# Patient Record
(demographics unavailable — no encounter records)

---

## 2024-10-30 NOTE — HISTORY OF PRESENT ILLNESS
[de-identified] : DRY COUGH, RUNNY NOSE [FreeTextEntry6] : 16 yr old F presenting for three days of cough. Endorses dry cough, intermittent, rhinorrhea and congestion. Denies sore throat, chest pain, change in energy or appetite.

## 2024-10-30 NOTE — DISCUSSION/SUMMARY
[FreeTextEntry1] : 16 year old F with symptoms likely 2/2 viral URI. PE and vitals are wnl.   Recommend supportive care including antipyretics, fluids, and humidifier/warm bath, then nasal saline followed by nasal suction. Education provided for signs of respiratory distress and dehydration. Return if symptoms worsen or persist.

## 2024-10-30 NOTE — PHYSICAL EXAM
[Erythematous Oropharynx] : erythematous oropharynx [NL] : warm, clear [Tired appearing] : not tired appearing [Lethargic] : not lethargic [Enlarged Tonsils] : tonsils not enlarged [Vesicles] : no vesicles [Exudate] : no exudate [Wheezing] : no wheezing [Crackles] : no crackles [Transmitted Upper Airway Sounds] : no transmitted upper airway sounds [Tachypnea] : no tachypnea [Belly Breathing] : no belly breathing [Subcostal Retractions] : no subcostal retractions

## 2024-10-30 NOTE — HISTORY OF PRESENT ILLNESS
[de-identified] : DRY COUGH, RUNNY NOSE [FreeTextEntry6] : 16 yr old F presenting for three days of cough. Endorses dry cough, intermittent, rhinorrhea and congestion. Denies sore throat, chest pain, change in energy or appetite.

## 2025-03-14 NOTE — HISTORY OF PRESENT ILLNESS
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

## 2025-03-14 NOTE — PLAN
[TextEntry] : RECOMMENDED HGB CHECK, BUT DECLINES AT THIS TIME DUE TO FEAR; ADVISED ADEQUATE HYDRATION AND INCREASING IRON-RICH FOODS IN DIET; DISCUSSED WHEN SHOULD SEEK FURTHER CARE, INCLUDING BLEEDING >10DAYS

## 2025-03-14 NOTE — REVIEW OF SYSTEMS
[Dysuria] : no dysuria [Polyuria] : no polyuria [Hematuria] : no hematuria [Irregular Menstrual Cycle] : irregular menstrual cycle [Vaginal Pain] : no vaginal pain [Negative] : Heme/Lymph [FreeTextEntry1] : STARTED MENARCHE AT 9 AND REPORTS HAVING REGULAR PERIODS IN LAST FEW YEARS

## 2025-04-23 NOTE — HISTORY OF PRESENT ILLNESS
[de-identified] : HEADACHES AFTER SLEEPING [FreeTextEntry6] :  17 yo female with Headaches for several days. Start in front, move to around back of head. 6/10 intensity. no blurry vision, no photophobia. motrin helping. diagnosed with UTI last week - called today with results so only started medication this afternoon. no fevers. no abdominal pain. no vomiting. Mild uri symptoms.   Following with GYN for irregular menses last month. LMP 3/27-4/8.

## 2025-04-23 NOTE — DISCUSSION/SUMMARY
[FreeTextEntry1] : 15 yo female here with headaches. No red flag symptoms. Differential diagnosis includes URI related as she has had mild symptoms over the past few days vs secondary to active UTI vs tension headaches vs related to upcoming menstrual cycle (as discussed with GYN at last visit per patient/grandma).   Continue Motrin as needed Headache diary Close monitoring.

## 2025-04-23 NOTE — PHYSICAL EXAM
[Acute Distress] : no acute distress [Alert] : alert [Tenderness] : no tenderness [Ecchymosis] : no ecchymosis [Traumatic] : atraumatic [EOMI] : grossly EOMI [Conjuctival Injection] : no conjunctival injection [Eyelid Swelling] : no eyelid swelling [Clear] : right tympanic membrane clear [Inflamed Nasal Mucosa] : inflamed nasal mucosa [NL] : soft, nontender, nondistended, normal bowel sounds, no hepatosplenomegaly [de-identified] : MMM

## 2025-04-23 NOTE — HISTORY OF PRESENT ILLNESS
[de-identified] : HEADACHES AFTER SLEEPING [FreeTextEntry6] :  17 yo female with Headaches for several days. Start in front, move to around back of head. 6/10 intensity. no blurry vision, no photophobia. motrin helping. diagnosed with UTI last week - called today with results so only started medication this afternoon. no fevers. no abdominal pain. no vomiting. Mild uri symptoms.   Following with GYN for irregular menses last month. LMP 3/27-4/8.

## 2025-04-23 NOTE — PHYSICAL EXAM
[Acute Distress] : no acute distress [Alert] : alert [Tenderness] : no tenderness [Ecchymosis] : no ecchymosis [Traumatic] : atraumatic [EOMI] : grossly EOMI [Conjuctival Injection] : no conjunctival injection [Eyelid Swelling] : no eyelid swelling [Clear] : right tympanic membrane clear [Inflamed Nasal Mucosa] : inflamed nasal mucosa [NL] : soft, nontender, nondistended, normal bowel sounds, no hepatosplenomegaly [de-identified] : MMM

## 2025-04-23 NOTE — REVIEW OF SYSTEMS
[Fever] : no fever [Chills] : no chills [Headache] : headache [Ear Pain] : no ear pain [Nasal Discharge] : no nasal discharge [Nasal Congestion] : nasal congestion [Sinus Pressure] : no sinus pressure [Negative] : Gastrointestinal